# Patient Record
Sex: FEMALE | Race: WHITE | NOT HISPANIC OR LATINO | URBAN - METROPOLITAN AREA
[De-identification: names, ages, dates, MRNs, and addresses within clinical notes are randomized per-mention and may not be internally consistent; named-entity substitution may affect disease eponyms.]

---

## 2017-10-27 ENCOUNTER — OFFICE (OUTPATIENT)
Dept: URBAN - METROPOLITAN AREA CLINIC 101 | Facility: CLINIC | Age: 22
End: 2017-10-27

## 2017-10-27 VITALS
DIASTOLIC BLOOD PRESSURE: 93 MMHG | HEART RATE: 74 BPM | SYSTOLIC BLOOD PRESSURE: 117 MMHG | WEIGHT: 154 LBS | HEIGHT: 62 IN | TEMPERATURE: 97.9 F

## 2017-10-27 DIAGNOSIS — G43.D0 ABDOMINAL MIGRAINE, NOT INTRACTABLE: ICD-10-CM

## 2017-10-27 DIAGNOSIS — R11.2 NAUSEA WITH VOMITING, UNSPECIFIED: ICD-10-CM

## 2017-10-27 PROCEDURE — 99243 OFF/OP CNSLTJ NEW/EST LOW 30: CPT

## 2017-12-14 ENCOUNTER — OFFICE (OUTPATIENT)
Dept: URBAN - METROPOLITAN AREA CLINIC 32 | Facility: CLINIC | Age: 22
End: 2017-12-14

## 2017-12-14 ENCOUNTER — INDEPENDENT LABORATORY (OUTPATIENT)
Dept: URBAN - METROPOLITAN AREA PATHOLOGY 17 | Facility: PATHOLOGY | Age: 22
End: 2017-12-14

## 2017-12-14 VITALS
DIASTOLIC BLOOD PRESSURE: 77 MMHG | HEART RATE: 93 BPM | SYSTOLIC BLOOD PRESSURE: 119 MMHG | SYSTOLIC BLOOD PRESSURE: 120 MMHG | HEART RATE: 88 BPM | OXYGEN SATURATION: 98 % | TEMPERATURE: 97.1 F | SYSTOLIC BLOOD PRESSURE: 129 MMHG | HEART RATE: 91 BPM | HEART RATE: 102 BPM | SYSTOLIC BLOOD PRESSURE: 121 MMHG | RESPIRATION RATE: 10 BRPM | RESPIRATION RATE: 16 BRPM | HEIGHT: 62 IN | SYSTOLIC BLOOD PRESSURE: 97 MMHG | RESPIRATION RATE: 21 BRPM | DIASTOLIC BLOOD PRESSURE: 76 MMHG | DIASTOLIC BLOOD PRESSURE: 82 MMHG | OXYGEN SATURATION: 100 % | RESPIRATION RATE: 18 BRPM | TEMPERATURE: 99.5 F | DIASTOLIC BLOOD PRESSURE: 59 MMHG | OXYGEN SATURATION: 95 % | DIASTOLIC BLOOD PRESSURE: 78 MMHG | OXYGEN SATURATION: 97 % | HEART RATE: 92 BPM | WEIGHT: 154 LBS

## 2017-12-14 DIAGNOSIS — K21.0 GASTRO-ESOPHAGEAL REFLUX DISEASE WITH ESOPHAGITIS: ICD-10-CM

## 2017-12-14 DIAGNOSIS — R11.2 NAUSEA WITH VOMITING, UNSPECIFIED: ICD-10-CM

## 2017-12-14 DIAGNOSIS — G43.D0 ABDOMINAL MIGRAINE, NOT INTRACTABLE: ICD-10-CM

## 2017-12-14 DIAGNOSIS — R14.2 ERUCTATION: ICD-10-CM

## 2017-12-14 PROBLEM — K31.89 OTHER DISEASES OF STOMACH AND DUODENUM: Status: ACTIVE | Noted: 2017-12-14

## 2017-12-14 PROCEDURE — 88342 IMHCHEM/IMCYTCHM 1ST ANTB: CPT

## 2017-12-14 PROCEDURE — 88312 SPECIAL STAINS GROUP 1: CPT

## 2017-12-14 PROCEDURE — 88313 SPECIAL STAINS GROUP 2: CPT

## 2017-12-14 PROCEDURE — 88305 TISSUE EXAM BY PATHOLOGIST: CPT

## 2017-12-14 RX ADMIN — LIDOCAINE HYDROCHLORIDE 60 MG: 20 INJECTION, SOLUTION INFILTRATION; PERINEURAL at 09:12

## 2017-12-14 NOTE — SERVICEHPINOTES
Pt presents for EGD due to symptoms of intermittent nausea and vomiting with associated belching, thought to be related to abdominal migraines no prior EGD.

## 2020-09-25 ENCOUNTER — TELEHEALTH PROVIDED OTHER THAN IN PATIENT'S HOME (OUTPATIENT)
Dept: URBAN - METROPOLITAN AREA TELEHEALTH 3 | Facility: TELEHEALTH | Age: 25
End: 2020-09-25
Payer: COMMERCIAL

## 2020-09-25 VITALS — HEIGHT: 62 IN | WEIGHT: 155 LBS

## 2020-09-25 DIAGNOSIS — R11.2 NAUSEA WITH VOMITING, UNSPECIFIED: ICD-10-CM

## 2020-09-25 PROCEDURE — 99214 OFFICE O/P EST MOD 30 MIN: CPT | Mod: 95 | Performed by: INTERNAL MEDICINE

## 2020-09-25 RX ORDER — OMEPRAZOLE 40 MG/1
CAPSULE, DELAYED RELEASE ORAL
Qty: 0 | Refills: 0 | Status: COMPLETED
End: 2022-07-11

## 2020-09-25 NOTE — SERVICEHPINOTES
PATIENT VERIFIED BY DATE OF BIRTH AND NAME. Patient has been consented for this telecommunication visit. 26 yo female last seen 3 years ago for n/v underwent EGD that revealed esophagitis and gastritis and she was treated with PPI. She has a long history of GI sx and as a child was diagnosed with abdominal migraines and treated with amitriptyline stopped about 3 months ago. She states she had a late meal and threw up this morning. She notes sx can be recurrent. Sx have been ongoing for 2 weeks and had to go to Urgent Care. She did get an US today. She states sx have been ongoing for about 10 years after taking an Advil. She has trouble when eating late. Other wise seems to eat fairly normally.

## 2021-01-13 ENCOUNTER — TELEHEALTH PROVIDED OTHER THAN IN PATIENT'S HOME (OUTPATIENT)
Dept: URBAN - METROPOLITAN AREA TELEHEALTH 12 | Facility: TELEHEALTH | Age: 26
End: 2021-01-13
Payer: COMMERCIAL

## 2021-01-13 VITALS — HEIGHT: 62 IN | WEIGHT: 155 LBS

## 2021-01-13 DIAGNOSIS — R11.2 NAUSEA WITH VOMITING, UNSPECIFIED: ICD-10-CM

## 2021-01-13 PROCEDURE — 99214 OFFICE O/P EST MOD 30 MIN: CPT | Mod: 95 | Performed by: INTERNAL MEDICINE

## 2021-01-13 NOTE — SERVICEHPINOTES
PATIENT VERIFIED BY DATE OF BIRTH AND NAME. Patient has been consented for this telecommunication visit. 24 yo female seen 4 months ago with n/v was treated with omeprazole and underwent gastric empty scan that was normal. She had an EGD in 2017 that showed mild esophagitis. She is doing ok staying away from fatty foods. She has resumed Amitriptyline that has seemed to help.

## 2022-07-11 ENCOUNTER — OFFICE (OUTPATIENT)
Dept: URBAN - METROPOLITAN AREA TELEHEALTH 12 | Facility: TELEHEALTH | Age: 27
End: 2022-07-11

## 2022-07-11 VITALS — WEIGHT: 180 LBS | HEIGHT: 62 IN

## 2022-07-11 DIAGNOSIS — R11.2 NAUSEA WITH VOMITING, UNSPECIFIED: ICD-10-CM

## 2022-07-11 PROCEDURE — 99213 OFFICE O/P EST LOW 20 MIN: CPT | Mod: 95

## 2022-07-11 RX ORDER — OMEPRAZOLE 40 MG/1
CAPSULE, DELAYED RELEASE ORAL
Qty: 30 | Refills: -1 | Status: ACTIVE
Start: 2022-07-11

## 2022-07-11 RX ORDER — AMITRIPTYLINE HYDROCHLORIDE 25 MG/1
25 TABLET, FILM COATED ORAL
Qty: 90 | Refills: 3 | Status: ACTIVE
Start: 2021-01-13

## 2022-07-11 NOTE — SERVICEHPINOTES
PATIENT VERIFIED BY DATE OF BIRTH AND NAME. Patient has been consented for this telecommunication visit.
dave acosta 28 y/o female requesting amitriptyline refill. Patient reports taking this medication for several years as it helps chronic nausea and vomiting. Per records, patient diagnosed with abdominal migraines around 2017 EGD at the time showed mild esophagitis. She completed a gastric emptying scan in 2020 which was normal. 
dave Patient notes she is virtually asymptomatic while on medication as long as she watches fatty foods, sugar, etc. She has tried to stop medication on several occasions and always has the same response: constant nausea and immediately vomiting anything she eats or drinks. Endorses mild heartburn, has been using omeprazole prn with good results. Her bowel habits are regular, trying to get a lot of fiber and water. Otherwise no complaints. Denies fevers, chills, night sweats, unintentional weight loss.
dave Dawkins 10 point review of systems have been reviewed as per HPI and otherwise negative. dave